# Patient Record
Sex: MALE
[De-identification: names, ages, dates, MRNs, and addresses within clinical notes are randomized per-mention and may not be internally consistent; named-entity substitution may affect disease eponyms.]

---

## 2023-05-02 PROBLEM — Z00.00 ENCOUNTER FOR PREVENTIVE HEALTH EXAMINATION: Status: ACTIVE | Noted: 2023-05-02

## 2023-06-29 ENCOUNTER — NON-APPOINTMENT (OUTPATIENT)
Age: 38
End: 2023-06-29

## 2023-06-29 DIAGNOSIS — H93.A9 PULSATILE TINNITUS, UNSPECIFIED EAR: ICD-10-CM

## 2023-06-29 NOTE — HISTORY OF PRESENT ILLNESS
[de-identified] : Mr. HECTOR is a pleasant 37 year old male who presents today with a chief complaint of RIGHT ear pulsatile tinnitus.  It is described as a constant, blood flow sound.  Pushing his right ear lobe up stops the sound. \par \par He has been seen by many different physicians without cause.  (0) independent

## 2023-06-29 NOTE — ASSESSMENT
[FreeTextEntry1] : Impression:\par RIGHT Pulsatile Tinnitus\par \par We discussed possible causes of pulsatile tinnitus including:\par ENT causes such as semicircular canal dehiscence, tumor, ototoxicity\par Cardiac causes such as aortic stenosis, valve disease, HTN, other causes of heart murmur\par Anemia\par Thyroid disease\par Cerebrovascular causes such as arteriovenous malformation (AVM), dural arteriovenous fistula (dAVF), venous sinus stenosis, venous aneurysm, large trans-mastoid emissary vein, carotid stenosis, jugular bulb diverticulum \par \par I reviewed MRA and CTV from 2022 which do not reveal any venous sinus stenosis or arterial pathology.  I reassured him that the sound that he describes is almost never caused by an underlying cerebrovascular issue and I do not see a cerebrovascular cause of his pulsatile tinnitus on imaging.  The sound that he describes is often caused by an ENT issue, cervical spine issue or somatosensory. \par \par Recommendations:\par Trial of Cervical Spine Physical Therapy\par Evaluation by Neuro-Otologist\par No need for further vascular work up related to his pulsatile tinnitus\par \par **Dr. Moore discussed results and recommendations with patient via video call**

## 2023-08-24 ENCOUNTER — APPOINTMENT (OUTPATIENT)
Dept: NEUROSURGERY | Facility: CLINIC | Age: 38
End: 2023-08-24